# Patient Record
Sex: FEMALE | HISPANIC OR LATINO | Employment: FULL TIME | URBAN - METROPOLITAN AREA
[De-identification: names, ages, dates, MRNs, and addresses within clinical notes are randomized per-mention and may not be internally consistent; named-entity substitution may affect disease eponyms.]

---

## 2021-07-08 ENCOUNTER — HOSPITAL ENCOUNTER (OUTPATIENT)
Facility: HOSPITAL | Age: 41
Setting detail: OBSERVATION
Discharge: HOME | End: 2021-07-10
Attending: EMERGENCY MEDICINE | Admitting: INTERNAL MEDICINE

## 2021-07-08 DIAGNOSIS — R55 SYNCOPE AND COLLAPSE: Primary | ICD-10-CM

## 2021-07-08 DIAGNOSIS — K52.9 ENTERITIS: ICD-10-CM

## 2021-07-08 DIAGNOSIS — R19.7 DIARRHEA, UNSPECIFIED TYPE: ICD-10-CM

## 2021-07-08 DIAGNOSIS — R56.9 WITNESSED SEIZURE-LIKE ACTIVITY (HCC): ICD-10-CM

## 2021-07-08 DIAGNOSIS — E87.6 HYPOKALEMIA: ICD-10-CM

## 2021-07-08 DIAGNOSIS — I95.9 HYPOTENSION, UNSPECIFIED HYPOTENSION TYPE: ICD-10-CM

## 2021-07-08 PROCEDURE — 93005 ELECTROCARDIOGRAM TRACING: CPT | Performed by: EMERGENCY MEDICINE

## 2021-07-08 PROCEDURE — 80053 COMPREHEN METABOLIC PANEL: CPT | Performed by: EMERGENCY MEDICINE

## 2021-07-08 PROCEDURE — 83690 ASSAY OF LIPASE: CPT | Performed by: EMERGENCY MEDICINE

## 2021-07-08 PROCEDURE — 93010 ELECTROCARDIOGRAM REPORT: CPT | Performed by: INTERNAL MEDICINE

## 2021-07-08 PROCEDURE — 99285 EMERGENCY DEPT VISIT HI MDM: CPT

## 2021-07-08 PROCEDURE — 85025 COMPLETE CBC W/AUTO DIFF WBC: CPT | Performed by: EMERGENCY MEDICINE

## 2021-07-08 PROCEDURE — 95806 SLEEP STUDY UNATT&RESP EFFT: CPT | Performed by: PSYCHIATRY & NEUROLOGY

## 2021-07-08 PROCEDURE — 84703 CHORIONIC GONADOTROPIN ASSAY: CPT | Performed by: EMERGENCY MEDICINE

## 2021-07-08 RX ORDER — SODIUM CHLORIDE 0.9 % (FLUSH) 0.9 %
10 SYRINGE (ML) INJECTION AS NEEDED
Status: DISCONTINUED | OUTPATIENT
Start: 2021-07-08 | End: 2021-07-10 | Stop reason: HOSPADM

## 2021-07-08 RX ADMIN — SODIUM CHLORIDE 1000 ML: 9 INJECTION, SOLUTION INTRAVENOUS at 23:40

## 2021-07-09 ENCOUNTER — APPOINTMENT (OUTPATIENT)
Dept: CT IMAGING | Facility: HOSPITAL | Age: 41
End: 2021-07-09

## 2021-07-09 PROBLEM — E87.6 HYPOKALEMIA: Status: ACTIVE | Noted: 2021-07-09

## 2021-07-09 PROBLEM — K52.9 ENTERITIS: Status: ACTIVE | Noted: 2021-07-09

## 2021-07-09 PROBLEM — R55 SYNCOPE AND COLLAPSE: Status: ACTIVE | Noted: 2021-07-09

## 2021-07-09 LAB
ALBUMIN SERPL-MCNC: 3.4 G/DL (ref 3.5–5.2)
ALBUMIN/GLOB SERPL: 2 G/DL
ALP SERPL-CCNC: 33 U/L (ref 39–117)
ALT SERPL W P-5'-P-CCNC: 15 U/L (ref 1–33)
ANION GAP SERPL CALCULATED.3IONS-SCNC: 10.7 MMOL/L (ref 5–15)
ANION GAP SERPL CALCULATED.3IONS-SCNC: 13.1 MMOL/L (ref 5–15)
AST SERPL-CCNC: 15 U/L (ref 1–32)
BASOPHILS # BLD AUTO: 0.05 10*3/MM3 (ref 0–0.2)
BASOPHILS NFR BLD AUTO: 0.8 % (ref 0–1.5)
BILIRUB SERPL-MCNC: 0.2 MG/DL (ref 0–1.2)
BILIRUB UR QL STRIP: NEGATIVE
BUN SERPL-MCNC: 11 MG/DL (ref 6–20)
BUN SERPL-MCNC: 16 MG/DL (ref 6–20)
BUN/CREAT SERPL: 20.4 (ref 7–25)
BUN/CREAT SERPL: 21.6 (ref 7–25)
CALCIUM SPEC-SCNC: 8.1 MG/DL (ref 8.6–10.5)
CALCIUM SPEC-SCNC: 8.2 MG/DL (ref 8.6–10.5)
CHLORIDE SERPL-SCNC: 102 MMOL/L (ref 98–107)
CHLORIDE SERPL-SCNC: 109 MMOL/L (ref 98–107)
CLARITY UR: CLEAR
CO2 SERPL-SCNC: 18.3 MMOL/L (ref 22–29)
CO2 SERPL-SCNC: 21.9 MMOL/L (ref 22–29)
COLOR UR: YELLOW
CREAT SERPL-MCNC: 0.54 MG/DL (ref 0.57–1)
CREAT SERPL-MCNC: 0.74 MG/DL (ref 0.57–1)
DEPRECATED RDW RBC AUTO: 40.2 FL (ref 37–54)
DEPRECATED RDW RBC AUTO: 41.8 FL (ref 37–54)
EOSINOPHIL # BLD AUTO: 0.07 10*3/MM3 (ref 0–0.4)
EOSINOPHIL NFR BLD AUTO: 1.1 % (ref 0.3–6.2)
ERYTHROCYTE [DISTWIDTH] IN BLOOD BY AUTOMATED COUNT: 11.8 % (ref 12.3–15.4)
ERYTHROCYTE [DISTWIDTH] IN BLOOD BY AUTOMATED COUNT: 12.1 % (ref 12.3–15.4)
GFR SERPL CREATININE-BSD FRML MDRD: 106 ML/MIN/1.73
GFR SERPL CREATININE-BSD FRML MDRD: 126 ML/MIN/1.73
GFR SERPL CREATININE-BSD FRML MDRD: 88 ML/MIN/1.73
GFR SERPL CREATININE-BSD FRML MDRD: >150 ML/MIN/1.73
GLOBULIN UR ELPH-MCNC: 1.7 GM/DL
GLUCOSE SERPL-MCNC: 116 MG/DL (ref 65–99)
GLUCOSE SERPL-MCNC: 84 MG/DL (ref 65–99)
GLUCOSE UR STRIP-MCNC: NEGATIVE MG/DL
HCG SERPL QL: NEGATIVE
HCT VFR BLD AUTO: 33.2 % (ref 34–46.6)
HCT VFR BLD AUTO: 35.4 % (ref 34–46.6)
HGB BLD-MCNC: 11.4 G/DL (ref 12–15.9)
HGB BLD-MCNC: 12.3 G/DL (ref 12–15.9)
HGB UR QL STRIP.AUTO: NEGATIVE
KETONES UR QL STRIP: NEGATIVE
LEUKOCYTE ESTERASE UR QL STRIP.AUTO: NEGATIVE
LIPASE SERPL-CCNC: 18 U/L (ref 13–60)
LYMPHOCYTES # BLD AUTO: 2.41 10*3/MM3 (ref 0.7–3.1)
LYMPHOCYTES NFR BLD AUTO: 37.2 % (ref 19.6–45.3)
MCH RBC QN AUTO: 32.5 PG (ref 26.6–33)
MCH RBC QN AUTO: 32.6 PG (ref 26.6–33)
MCHC RBC AUTO-ENTMCNC: 34.3 G/DL (ref 31.5–35.7)
MCHC RBC AUTO-ENTMCNC: 34.7 G/DL (ref 31.5–35.7)
MCV RBC AUTO: 93.4 FL (ref 79–97)
MCV RBC AUTO: 94.9 FL (ref 79–97)
MONOCYTES # BLD AUTO: 0.53 10*3/MM3 (ref 0.1–0.9)
MONOCYTES NFR BLD AUTO: 8.2 % (ref 5–12)
NEUTROPHILS NFR BLD AUTO: 3.39 10*3/MM3 (ref 1.7–7)
NEUTROPHILS NFR BLD AUTO: 52.4 % (ref 42.7–76)
NITRITE UR QL STRIP: NEGATIVE
PH UR STRIP.AUTO: <=5 [PH] (ref 5–8)
PLATELET # BLD AUTO: 242 10*3/MM3 (ref 140–450)
PLATELET # BLD AUTO: 247 10*3/MM3 (ref 140–450)
PMV BLD AUTO: 8.8 FL (ref 6–12)
PMV BLD AUTO: 8.9 FL (ref 6–12)
POTASSIUM SERPL-SCNC: 3.1 MMOL/L (ref 3.5–5.2)
POTASSIUM SERPL-SCNC: 4 MMOL/L (ref 3.5–5.2)
PROT SERPL-MCNC: 5.1 G/DL (ref 6–8.5)
PROT UR QL STRIP: NEGATIVE
QT INTERVAL: 382 MS
RBC # BLD AUTO: 3.5 10*6/MM3 (ref 3.77–5.28)
RBC # BLD AUTO: 3.79 10*6/MM3 (ref 3.77–5.28)
SARS-COV-2 ORF1AB RESP QL NAA+PROBE: NOT DETECTED
SODIUM SERPL-SCNC: 137 MMOL/L (ref 136–145)
SODIUM SERPL-SCNC: 138 MMOL/L (ref 136–145)
SP GR UR STRIP: 1.02 (ref 1–1.03)
UROBILINOGEN UR QL STRIP: NORMAL
WBC # BLD AUTO: 4.32 10*3/MM3 (ref 3.4–10.8)
WBC # BLD AUTO: 6.47 10*3/MM3 (ref 3.4–10.8)

## 2021-07-09 PROCEDURE — 74177 CT ABD & PELVIS W/CONTRAST: CPT

## 2021-07-09 PROCEDURE — 96374 THER/PROPH/DIAG INJ IV PUSH: CPT

## 2021-07-09 PROCEDURE — 81003 URINALYSIS AUTO W/O SCOPE: CPT | Performed by: EMERGENCY MEDICINE

## 2021-07-09 PROCEDURE — C9803 HOPD COVID-19 SPEC COLLECT: HCPCS

## 2021-07-09 PROCEDURE — 80048 BASIC METABOLIC PNL TOTAL CA: CPT | Performed by: NURSE PRACTITIONER

## 2021-07-09 PROCEDURE — G0378 HOSPITAL OBSERVATION PER HR: HCPCS

## 2021-07-09 PROCEDURE — 36415 COLL VENOUS BLD VENIPUNCTURE: CPT | Performed by: NURSE PRACTITIONER

## 2021-07-09 PROCEDURE — 82550 ASSAY OF CK (CPK): CPT | Performed by: HOSPITALIST

## 2021-07-09 PROCEDURE — 96361 HYDRATE IV INFUSION ADD-ON: CPT

## 2021-07-09 PROCEDURE — 85027 COMPLETE CBC AUTOMATED: CPT | Performed by: NURSE PRACTITIONER

## 2021-07-09 PROCEDURE — 99204 OFFICE O/P NEW MOD 45 MIN: CPT | Performed by: PSYCHIATRY & NEUROLOGY

## 2021-07-09 PROCEDURE — 25010000002 KETOROLAC TROMETHAMINE PER 15 MG: Performed by: EMERGENCY MEDICINE

## 2021-07-09 PROCEDURE — U0004 COV-19 TEST NON-CDC HGH THRU: HCPCS | Performed by: HOSPITALIST

## 2021-07-09 PROCEDURE — 25810000003 SODIUM CHLORIDE 0.9 % WITH KCL 20 MEQ 20-0.9 MEQ/L-% SOLUTION: Performed by: NURSE PRACTITIONER

## 2021-07-09 PROCEDURE — 25010000002 IOPAMIDOL 61 % SOLUTION: Performed by: EMERGENCY MEDICINE

## 2021-07-09 RX ORDER — TOPIRAMATE 50 MG/1
50 TABLET, FILM COATED ORAL NIGHTLY
Status: DISCONTINUED | OUTPATIENT
Start: 2021-07-09 | End: 2021-07-10 | Stop reason: HOSPADM

## 2021-07-09 RX ORDER — SUMATRIPTAN 100 MG/1
100 TABLET, FILM COATED ORAL ONCE
Status: COMPLETED | OUTPATIENT
Start: 2021-07-09 | End: 2021-07-09

## 2021-07-09 RX ORDER — SODIUM CHLORIDE AND POTASSIUM CHLORIDE 150; 900 MG/100ML; MG/100ML
100 INJECTION, SOLUTION INTRAVENOUS CONTINUOUS
Status: DISCONTINUED | OUTPATIENT
Start: 2021-07-09 | End: 2021-07-09

## 2021-07-09 RX ORDER — ACETAMINOPHEN 325 MG/1
650 TABLET ORAL EVERY 4 HOURS PRN
Status: DISCONTINUED | OUTPATIENT
Start: 2021-07-09 | End: 2021-07-10 | Stop reason: HOSPADM

## 2021-07-09 RX ORDER — MULTIPLE VITAMINS W/ MINERALS TAB 9MG-400MCG
1 TAB ORAL DAILY
COMMUNITY

## 2021-07-09 RX ORDER — NITROGLYCERIN 0.4 MG/1
0.4 TABLET SUBLINGUAL
Status: DISCONTINUED | OUTPATIENT
Start: 2021-07-09 | End: 2021-07-10 | Stop reason: HOSPADM

## 2021-07-09 RX ORDER — ONDANSETRON 4 MG/1
4 TABLET, FILM COATED ORAL EVERY 6 HOURS PRN
Status: DISCONTINUED | OUTPATIENT
Start: 2021-07-09 | End: 2021-07-10 | Stop reason: HOSPADM

## 2021-07-09 RX ORDER — ALUMINA, MAGNESIA, AND SIMETHICONE 2400; 2400; 240 MG/30ML; MG/30ML; MG/30ML
15 SUSPENSION ORAL EVERY 6 HOURS PRN
Status: DISCONTINUED | OUTPATIENT
Start: 2021-07-09 | End: 2021-07-10 | Stop reason: HOSPADM

## 2021-07-09 RX ORDER — SODIUM CHLORIDE 0.9 % (FLUSH) 0.9 %
10 SYRINGE (ML) INJECTION AS NEEDED
Status: DISCONTINUED | OUTPATIENT
Start: 2021-07-09 | End: 2021-07-10 | Stop reason: HOSPADM

## 2021-07-09 RX ORDER — MELATONIN
1000 DAILY
COMMUNITY

## 2021-07-09 RX ORDER — KETOROLAC TROMETHAMINE 15 MG/ML
15 INJECTION, SOLUTION INTRAMUSCULAR; INTRAVENOUS ONCE
Status: COMPLETED | OUTPATIENT
Start: 2021-07-09 | End: 2021-07-09

## 2021-07-09 RX ORDER — ONDANSETRON 2 MG/ML
4 INJECTION INTRAMUSCULAR; INTRAVENOUS EVERY 6 HOURS PRN
Status: DISCONTINUED | OUTPATIENT
Start: 2021-07-09 | End: 2021-07-10 | Stop reason: HOSPADM

## 2021-07-09 RX ADMIN — SUMATRIPTAN SUCCINATE 100 MG: 100 TABLET, FILM COATED ORAL at 19:50

## 2021-07-09 RX ADMIN — IOPAMIDOL 85 ML: 612 INJECTION, SOLUTION INTRAVENOUS at 01:16

## 2021-07-09 RX ADMIN — POTASSIUM CHLORIDE AND SODIUM CHLORIDE 100 ML/HR: 900; 150 INJECTION, SOLUTION INTRAVENOUS at 08:51

## 2021-07-09 RX ADMIN — KETOROLAC TROMETHAMINE 15 MG: 15 INJECTION, SOLUTION INTRAMUSCULAR; INTRAVENOUS at 01:57

## 2021-07-09 RX ADMIN — TOPIRAMATE 50 MG: 50 TABLET, FILM COATED ORAL at 19:51

## 2021-07-09 RX ADMIN — SODIUM CHLORIDE 1000 ML: 9 INJECTION, SOLUTION INTRAVENOUS at 01:57

## 2021-07-09 NOTE — CASE MANAGEMENT/SOCIAL WORK
Discharge Planning Assessment  [facility]     Patient Name: Kelsey Theodore  MRN: [id number]  Today's Date: 7/9/2021    Admit Date: 7/8/2021    Discharge Needs Assessment     Row Name 07/09/21 0840       Living Environment    Lives With  spouse;child(sheila), dependent    Current Living Arrangements  home/apartment/condo    Primary Care Provided by  self    Provides Primary Care For  child(sheila)    Family Caregiver if Needed  spouse    Quality of Family Relationships  helpful;involved;supportive    Able to Return to Prior Arrangements  yes       Resource/Environmental Concerns    Resource/Environmental Concerns  none       Transition Planning    Patient/Family Anticipates Transition to  home with family    Patient/Family Anticipated Services at Transition  none    Transportation Anticipated  family or friend will provide;car, drives self       Discharge Needs Assessment    Readmission Within the Last 30 Days  no previous admission in last 30 days    Equipment Currently Used at Home  none    Concerns to be Addressed  denies needs/concerns at this time;no discharge needs identified    Anticipated Changes Related to Illness  none    Equipment Needed After Discharge  none    Provided Post Acute Provider List?  N/A    Provided Post Acute Provider Quality & Resource List?  N/A        Discharge Plan     Row Name 07/09/21 0840       Plan    Plan  Home with family    Plan Comments  S/W pt and her spouse Alonzo at bedside.  Facesheet and pharmacy info confirmed.  Pt lives in a house with her  and 4 children, is IADLs and can drive.  She works full time.  No hx of DME, HH or SNF. Pt plans to return home with her family upon DC.  She denies any DC needs at this time.  CCP will continue to follow ..........sk        Continued Care and Services - Admitted Since 7/8/2021    Coordination has not been started for this encounter.         Demographic Summary     Row Name 07/09/21 0838       General Information    Admission Type   observation    Arrived From  home    Referral Source  admission list    Reason for Consult  discharge planning    Preferred Language  English        Functional Status     Row Name 07/09/21 0838       Functional Status    Usual Activity Tolerance  good    Current Activity Tolerance  good       Functional Status, IADL    Medications  independent    Meal Preparation  independent;assistive person    Housekeeping  independent;assistive person    Laundry  independent;assistive person    Shopping  independent;assistive person       Mental Status    General Appearance WDL  WDL       Mental Status Summary    Recent Changes in Mental Status/Cognitive Functioning  no changes       Employment/    Employment Status  employed full-time            Savannah Michael RN

## 2021-07-09 NOTE — ED PROVIDER NOTES
EMERGENCY DEPARTMENT ENCOUNTER    CHIEF COMPLAINT  Chief Complaint: Syncope/abdominal pain  History given by: Patient  History limited by: None  Room Number:   PMD: Provider, No Known      HPI:  Pt is a 40 y.o. female who presents complaining of gradual in onset but progressively worsening generalized abdominal discomfort that has been intermittently present for the past 2 weeks.  She states that the pain did become more severe tonight causing her to pass out several times.  Upon EMS arrival, the patient was moderately hypotensive and was given a liter of normal saline in route.  The patient describes the discomfort as a crampy sensation that is nonlocalized and nonradiating.  She states that it is moderate in intensity and diffusely through her abdomen.  She states that it is made worse by touch and not alleviated by any known factor thus far.  She denies previous episodes of similar symptoms.  She does state that she has some associated watery diarrhea but denies nausea/vomiting, fever/chills, or back pain.  She denies any recent antibiotic use or any recent travel.      PAST MEDICAL HISTORY  Active Ambulatory Problems     Diagnosis Date Noted   • No Active Ambulatory Problems     Resolved Ambulatory Problems     Diagnosis Date Noted   • No Resolved Ambulatory Problems     No Additional Past Medical History       PAST SURGICAL HISTORY  Past Surgical History:   Procedure Laterality Date   • ABDOMINOPLASTY     •  SECTION         FAMILY HISTORY  History reviewed. No pertinent family history.    SOCIAL HISTORY  Social History     Socioeconomic History   • Marital status:      Spouse name: Not on file   • Number of children: Not on file   • Years of education: Not on file   • Highest education level: Not on file       ALLERGIES  Penicillins    REVIEW OF SYSTEMS  Review of Systems   Constitutional: Negative for fever.   HENT: Negative for sore throat.    Eyes: Negative.    Respiratory: Negative for  cough and shortness of breath.    Cardiovascular: Negative for chest pain.   Gastrointestinal: Positive for abdominal pain and diarrhea. Negative for vomiting.   Genitourinary: Negative for dysuria.   Musculoskeletal: Negative for neck pain.   Skin: Negative for rash.   Allergic/Immunologic: Negative.    Neurological: Negative for weakness, numbness and headaches.   Hematological: Negative.    Psychiatric/Behavioral: Negative.    All other systems reviewed and are negative.      PHYSICAL EXAM  ED Triage Vitals [07/08/21 2300]   Temp Heart Rate Resp BP SpO2   97.6 °F (36.4 °C) 99 18 97/56 98 %      Temp src Heart Rate Source Patient Position BP Location FiO2 (%)   Tympanic Monitor Lying Right arm --       Physical Exam  Vitals and nursing note reviewed.   Constitutional:       General: She is not in acute distress.  HENT:      Head: Normocephalic and atraumatic.   Eyes:      Pupils: Pupils are equal, round, and reactive to light.   Cardiovascular:      Rate and Rhythm: Normal rate and regular rhythm.      Heart sounds: Normal heart sounds.   Pulmonary:      Effort: Pulmonary effort is normal. No respiratory distress.      Breath sounds: Normal breath sounds.   Abdominal:      Palpations: Abdomen is soft.      Tenderness: There is generalized abdominal tenderness. There is no guarding or rebound.   Musculoskeletal:         General: Normal range of motion.      Cervical back: Normal range of motion and neck supple.   Skin:     General: Skin is warm and dry.      Findings: No rash.   Neurological:      Mental Status: She is alert and oriented to person, place, and time.      Sensory: Sensation is intact.   Psychiatric:         Mood and Affect: Mood and affect normal.         LAB RESULTS  Lab Results (last 24 hours)     Procedure Component Value Units Date/Time    CBC & Differential [[id number]]  (Abnormal) Collected: 07/08/21 2349    Specimen: Blood Updated: 07/09/21 0006    Narrative:      The following orders were  created for panel order CBC & Differential.  Procedure                               Abnormality         Status                     ---------                               -----------         ------                     CBC Auto Differential[[id number]]        Abnormal            Final result                 Please view results for these tests on the individual orders.    Comprehensive Metabolic Panel [[id number]]  (Abnormal) Collected: 07/08/21 2349    Specimen: Blood Updated: 07/09/21 0014     Glucose 116 mg/dL      BUN 16 mg/dL      Creatinine 0.74 mg/dL      Sodium 137 mmol/L      Potassium 3.1 mmol/L      Chloride 102 mmol/L      CO2 21.9 mmol/L      Calcium 8.2 mg/dL      Total Protein 5.1 g/dL      Albumin 3.40 g/dL      ALT (SGPT) 15 U/L      AST (SGOT) 15 U/L      Alkaline Phosphatase 33 U/L      Total Bilirubin 0.2 mg/dL      eGFR Non African Amer 88 mL/min/1.73      eGFR  African Amer 106 mL/min/1.73      Globulin 1.7 gm/dL      A/G Ratio 2.0 g/dL      BUN/Creatinine Ratio 21.6     Anion Gap 13.1 mmol/L     Narrative:      GFR Normal >60  Chronic Kidney Disease <60  Kidney Failure <15      Lipase [[id number]]  (Normal) Collected: 07/08/21 2349    Specimen: Blood Updated: 07/09/21 0014     Lipase 18 U/L     hCG, Serum, Qualitative [[id number]]  (Normal) Collected: 07/08/21 2349    Specimen: Blood Updated: 07/09/21 0013     HCG Qualitative Negative    CBC Auto Differential [[id number]]  (Abnormal) Collected: 07/08/21 2349    Specimen: Blood Updated: 07/09/21 0006     WBC 6.47 10*3/mm3      RBC 3.50 10*6/mm3      Hemoglobin 11.4 g/dL      Hematocrit 33.2 %      MCV 94.9 fL      MCH 32.6 pg      MCHC 34.3 g/dL      RDW 12.1 %      RDW-SD 41.8 fl      MPV 8.9 fL      Platelets 242 10*3/mm3      Neutrophil % 52.4 %      Lymphocyte % 37.2 %      Monocyte % 8.2 %      Eosinophil % 1.1 %      Basophil % 0.8 %      Neutrophils, Absolute 3.39 10*3/mm3      Lymphocytes, Absolute 2.41 10*3/mm3      Monocytes, Absolute 0.53  10*3/mm3      Eosinophils, Absolute 0.07 10*3/mm3      Basophils, Absolute 0.05 10*3/mm3           I ordered the above labs and reviewed the results    RADIOLOGY  CT Abdomen Pelvis With Contrast   Final Result           I ordered the above noted radiological studies. Interpreted by radiologist. Discussed with radiologist (Dr. Gottlieb). Reviewed by me in PACS.       PROCEDURES  Procedures  EKG          EKG time: 2342  Rhythm/Rate: NSR, 83  P waves and NV: nml  QRS, axis: nml, nml   ST and T waves: nml     Interpreted Contemporaneously by me, independently viewed  No previous EKG available for comparison      PROGRESS AND CONSULTS     The patient was wearing a facemask upon entrance into the room and remained in such throughout their visit.  I was wearing PPE including a facemask, eye protection, as well as gloves at any point entering the room and throughout the visit    0130  On reevaluation, after 2 L of fluid in the emergency room, the patient's blood pressure has improved and is now over 100 systolic.  She states that her abdominal discomfort has improved somewhat.  I did inform her that the CT scan is consistent with likely enteritis however with the ongoing discomfort and the multiple syncopal events, would like to admit her to the hospital for further management and treatment.  The patient as well as patient's family are in agreement and all questions have been answered.    0235  Case discussed with VENANCIO Boss for [facility], who agrees to admit the patient to the hospital for Dr. Peng      MEDICAL DECISION MAKING  Results were reviewed/discussed with the patient and they were also made aware of online access. Pt also made aware that some labs, such as cultures, will not be resulted during ER visit and follow up with PMD is necessary.     MDM  Number of Diagnoses or Management Options     Amount and/or Complexity of Data Reviewed  Clinical lab tests: ordered and reviewed  Tests in the radiology  section of CPT®: ordered and reviewed  Tests in the medicine section of CPT®: ordered and reviewed  Review and summarize past medical records: yes (There are no previous emergency room records available for review)  Discuss the patient with other providers: yes (VENANCIO Boss for [facility], who will admit the patient for Dr. Peng)  Independent visualization of images, tracings, or specimens: yes (CT scan of the abdomen and pelvis consistent with enteritis without obstruction or abscess.)           DIAGNOSIS  Final diagnoses:   Syncope and collapse   Enteritis   Diarrhea, unspecified type   Hypotension, unspecified hypotension type       DISPOSITION  ADMISSION    Discussed treatment plan and reason for admission with pt/family and admitting physician.  Pt/family voiced understanding of the plan for admission for further testing/treatment as needed.         Latest Documented Vital Signs:  As of 02:42 EDT  BP- 101/65 HR- 79 Temp- 97.6 °F (36.4 °C) (Tympanic) O2 sat- 99%         David Fall MD  07/09/21 0243

## 2021-07-09 NOTE — H&P
Patient Name:  Kelsey Theodore  YOB: 1980  MRN:  [id number]  Admit Date:  7/8/2021  Patient Care Team:  Provider, No Known as PCP - General      Subjective   History Present Illness     Chief Complaint   Patient presents with   • Syncope   • Abdominal Pain     History of Present Illness   Ms. Theodore is a 40 y.o. non-smoker with no known medical history that presents to [facility] complaining of abdominal pain diarrhea.  Patient reports has been having intermittent abdominal pain for the past 2 weeks and diarrhea for the past 2 days.  She reports abdominal pain is crampy in sensation and nonradiating.  She reports nothing alleviates her symptoms and nothing makes it worse.  Patient's  at bedside states that she has not been eating much of anything lately due to stress.  She admits to nausea but denies vomiting.  She was with some friends today and apparently she passed out twice.  Upon EMS arrival, patient's systolic blood pressure was in the 70s.  CT abdominal pelvis shows enteritis.  She has been admitted for further evaluation and treatment.    Review of Systems   Constitutional: Negative for chills and fever.   HENT: Negative for congestion and rhinorrhea.    Eyes: Negative for photophobia and visual disturbance.   Respiratory: Negative for cough and shortness of breath.    Cardiovascular: Negative for chest pain and palpitations.   Gastrointestinal: Positive for abdominal pain, diarrhea and nausea. Negative for constipation and vomiting.   Endocrine: Negative for cold intolerance and heat intolerance.   Genitourinary: Negative for difficulty urinating and dysuria.   Musculoskeletal: Negative for gait problem and joint swelling.   Skin: Negative for rash and wound.   Neurological: Negative for dizziness, light-headedness and headaches.   Psychiatric/Behavioral: Negative for sleep disturbance and suicidal ideas.        Personal History     History reviewed. No pertinent  past medical history.  Past Surgical History:   Procedure Laterality Date   • ABDOMINOPLASTY     •  SECTION       History reviewed. No pertinent family history.  Social History     Tobacco Use   • Smoking status: Never Smoker   • Smokeless tobacco: Never Used   Substance Use Topics   • Alcohol use: Not on file   • Drug use: Not on file     No current facility-administered medications on file prior to encounter.     Current Outpatient Medications on File Prior to Encounter   Medication Sig Dispense Refill   • cholecalciferol (VITAMIN D3) 25 MCG (1000 UT) tablet Take 1,000 Units by mouth Daily.     • multivitamin with minerals (MULTIVITAMIN ADULT PO) Take 1 tablet by mouth Daily.       Allergies   Allergen Reactions   • Penicillins Other (See Comments)     UNSURE        Objective    Objective     Vital Signs  Temp:  [97.6 °F (36.4 °C)] 97.6 °F (36.4 °C)  Heart Rate:  [74-99] 74  Resp:  [18] 18  BP: ()/(54-65) 99/63  SpO2:  [97 %-100 %] 98 %  on   ;   Device (Oxygen Therapy): room air  Body mass index is 25.53 kg/m².    Physical Exam  Vitals and nursing note reviewed.   Constitutional:       General: She is not in acute distress.     Appearance: She is well-developed. She is not toxic-appearing.   HENT:      Head: Normocephalic and atraumatic.   Eyes:      General: No scleral icterus.        Right eye: No discharge.         Left eye: No discharge.      Conjunctiva/sclera: Conjunctivae normal.   Neck:      Vascular: No JVD.   Cardiovascular:      Rate and Rhythm: Normal rate and regular rhythm.      Heart sounds: Normal heart sounds. No murmur heard.   No friction rub. No gallop.    Pulmonary:      Effort: Pulmonary effort is normal. No respiratory distress.      Breath sounds: Normal breath sounds. No wheezing or rales.   Abdominal:      General: Bowel sounds are normal. There is no distension.      Palpations: Abdomen is soft.      Tenderness: There is abdominal tenderness. There is no guarding or  rebound.   Musculoskeletal:         General: No tenderness or deformity. Normal range of motion.      Cervical back: Normal range of motion and neck supple.   Skin:     General: Skin is warm and dry.      Capillary Refill: Capillary refill takes less than 2 seconds.   Neurological:      Mental Status: She is alert and oriented to person, place, and time.   Psychiatric:         Behavior: Behavior normal.       Results Review:  I reviewed the patient's new clinical results.  Discussed with ED provider.    Lab Results (last 24 hours)     Procedure Component Value Units Date/Time    CBC & Differential [[id number]]  (Abnormal) Collected: 07/08/21 2349    Specimen: Blood Updated: 07/09/21 0006    Narrative:      The following orders were created for panel order CBC & Differential.  Procedure                               Abnormality         Status                     ---------                               -----------         ------                     CBC Auto Differential[[id number]]        Abnormal            Final result                 Please view results for these tests on the individual orders.    Comprehensive Metabolic Panel [[id number]]  (Abnormal) Collected: 07/08/21 2349    Specimen: Blood Updated: 07/09/21 0014     Glucose 116 mg/dL      BUN 16 mg/dL      Creatinine 0.74 mg/dL      Sodium 137 mmol/L      Potassium 3.1 mmol/L      Chloride 102 mmol/L      CO2 21.9 mmol/L      Calcium 8.2 mg/dL      Total Protein 5.1 g/dL      Albumin 3.40 g/dL      ALT (SGPT) 15 U/L      AST (SGOT) 15 U/L      Alkaline Phosphatase 33 U/L      Total Bilirubin 0.2 mg/dL      eGFR Non African Amer 88 mL/min/1.73      eGFR  African Amer 106 mL/min/1.73      Globulin 1.7 gm/dL      A/G Ratio 2.0 g/dL      BUN/Creatinine Ratio 21.6     Anion Gap 13.1 mmol/L     Narrative:      GFR Normal >60  Chronic Kidney Disease <60  Kidney Failure <15      Lipase [[id number]]  (Normal) Collected: 07/08/21 2349    Specimen: Blood Updated: 07/09/21  0014     Lipase 18 U/L     hCG, Serum, Qualitative [[id number]]  (Normal) Collected: 07/08/21 2349    Specimen: Blood Updated: 07/09/21 0013     HCG Qualitative Negative    CBC Auto Differential [[id number]]  (Abnormal) Collected: 07/08/21 2349    Specimen: Blood Updated: 07/09/21 0006     WBC 6.47 10*3/mm3      RBC 3.50 10*6/mm3      Hemoglobin 11.4 g/dL      Hematocrit 33.2 %      MCV 94.9 fL      MCH 32.6 pg      MCHC 34.3 g/dL      RDW 12.1 %      RDW-SD 41.8 fl      MPV 8.9 fL      Platelets 242 10*3/mm3      Neutrophil % 52.4 %      Lymphocyte % 37.2 %      Monocyte % 8.2 %      Eosinophil % 1.1 %      Basophil % 0.8 %      Neutrophils, Absolute 3.39 10*3/mm3      Lymphocytes, Absolute 2.41 10*3/mm3      Monocytes, Absolute 0.53 10*3/mm3      Eosinophils, Absolute 0.07 10*3/mm3      Basophils, Absolute 0.05 10*3/mm3     COVID PRE-OP / PRE-PROCEDURE SCREENING ORDER (NO ISOLATION) - Swab, Nasopharynx [[id number]] Collected: 07/09/21 0255    Specimen: Swab from Nasopharynx Updated: 07/09/21 0326    Narrative:      The following orders were created for panel order COVID PRE-OP / PRE-PROCEDURE SCREENING ORDER (NO ISOLATION) - Swab, Nasopharynx.  Procedure                               Abnormality         Status                     ---------                               -----------         ------                     COVID-19,APTIMA PANTHER,...[[id number]]                      In process                   Please view results for these tests on the individual orders.    COVID-19,APTIMA PANTHER,[facility] IN-HOUSE, NP/OP SWAB IN UTM/VTM/SALINE TRANSPORT MEDIA,24 HR TAT - Swab, Nasopharynx [[id number]] Collected: 07/09/21 0255    Specimen: Swab from Nasopharynx Updated: 07/09/21 0326    Urinalysis With Microscopic If Indicated (No Culture) - Urine, Clean Catch [[id number]]  (Normal) Collected: 07/09/21 0325    Specimen: Urine, Clean Catch Updated: 07/09/21 0343     Color, UA Yellow     Appearance, UA Clear     pH, UA <=5.0      Specific Gravity, UA 1.022     Glucose, UA Negative     Ketones, UA Negative     Bilirubin, UA Negative     Blood, UA Negative     Protein, UA Negative     Leuk Esterase, UA Negative     Nitrite, UA Negative     Urobilinogen, UA 0.2 E.U./dL    Narrative:      Urine microscopic not indicated.          Imaging Results (Last 24 Hours)     Procedure Component Value Units Date/Time    CT Abdomen Pelvis With Contrast [[id number]] Collected: 07/09/21 0120     Updated: 07/09/21 0154    Narrative:      CT ABDOMEN PELVIS W CONTRAST-     Radiation dose reduction techniques were utilized, including automated  exposure control and exposure modulation based on body size.     Pedicle: Abdominal pain, acute, nonlocalized     COMPARISON: None     FINDINGS: The liver, gallbladder, pancreas and spleen are satisfactory  in appearance. No biliary duct dilatation. Both adrenal glands are  normal. Both kidneys are normal. No obstructive uropathy or calculus.  Diameter of the aorta is within normal limits. Urinary bladder is  normal. Uterus is anteverted, size appropriate for age. Both ovaries are  symmetric and satisfactory in appearance. No free fluid within the  pelvic cul-de-sac. No adnexal abnormality seen.     Formed fecal material is demonstrated throughout the colon. No colon  Wall thickening nor induration of the surrounding fat to suggest an  inflammatory process. A loop of jejunum within the left upper quadrant  is prominent with wall and fold thickening, suggestive of enteritis. The  stomach is distended and fluid-filled, no wall thickening seen. The  duodenal bulb is satisfactory in appearance. The remainder of the  duodenum is unremarkable. No free air.     CONCLUSION: Prominent loop of jejunum within the left upper quadrant  with wall and fold thickening suggestive of enteritis. Stomach is  distended and fluid-filled however no wall thickening seen. The  remainder of examination is unremarkable.     FINDINGS of this port  called to Yasir Fall at the time of completion  1:30 AM.        This report was finalized on 7/9/2021 1:50 AM by Dr. Otf Gottlieb M.D.                 ECG 12 Lead   Preliminary Result   HEART RATE= 83  bpm   RR Interval= 720  ms   ME Interval= 217  ms   P Horizontal Axis= 10  deg   P Front Axis= 46  deg   QRSD Interval= 87  ms   QT Interval= 382  ms   QRS Axis= 66  deg   T Wave Axis= 39  deg   - ABNORMAL ECG -   Sinus rhythm   Prolonged ME interval   Electronically Signed By:    Date and Time of Study: 2021-07-08 23:42:51           Assessment/Plan     Active Hospital Problems    Diagnosis  POA   • **Enteritis [K52.9]  Yes   • Syncope and collapse [R55]  Yes   • Hypokalemia [E87.6]  Yes      Resolved Hospital Problems   No resolved problems to display.       Ms. Theodore is a 40 y.o. non-smoker who presents with abdominal pain, nausea, and diarrhea and reportedly had a syncopal episode x3 was found to have enteritis.    Enteritis  -Check stool for C. difficile and obtaine GI PCR panel  -Supportive care with IV hydration, antiemetics, and analgesia  -Consider GI consultation if no improvement by the morning    Syncope and collapse  -Likely secondary to hypotension as patient's systolic blood pressure was in the 70s.  Patient's  states patient has not been eating or drinking much for the last several days due to stress.  -Check orthostatic  -Defer cardiology consultation to winifred HEALY.    Hypokalemia  -Place  -Recheck in a.m.      I discussed the patient's findings and my recommendations with patient, family, nursing staff and ED provider.    VTE Prophylaxis - SCDs.  Code Status - Full code.       VENANCIO Peterson  [facility]  07/09/21  04:09 EDT

## 2021-07-09 NOTE — ED NOTES
Nursing report ED to floor  Kelsey Theodore  40 y.o.  female    HPI (triage note):   Chief Complaint   Patient presents with   • Syncope   • Abdominal Pain       Admitting doctor:   Devan Peng MD    Admitting diagnosis:   The primary encounter diagnosis was Syncope and collapse. Diagnoses of Enteritis, Diarrhea, unspecified type, and Hypotension, unspecified hypotension type were also pertinent to this visit.    Code status:   Current Code Status     Date Active Code Status Order ID Comments User Context       Not on file    Advance Care Planning Activity          Allergies:   Penicillins    Weight:   There were no vitals filed for this visit.    Most recent vitals:   Vitals:    07/08/21 2300 07/09/21 0030 07/09/21 0122 07/09/21 0130   BP: 97/56 95/54 105/62 101/65   BP Location: Right arm      Patient Position: Lying      Pulse: 99 84 79    Resp: 18      Temp: 97.6 °F (36.4 °C)      TempSrc: Tympanic      SpO2: 98% 97% 100% 99%       Active LDAs/IV Access:   Lines, Drains & Airways    Active LDAs     Name:   Placement date:   Placement time:   Site:   Days:    Peripheral IV 07/08/21 2327 Left Antecubital   07/08/21 2327    Antecubital   less than 1                Labs (abnormal labs have a star):   Labs Reviewed   COMPREHENSIVE METABOLIC PANEL - Abnormal; Notable for the following components:       Result Value    Glucose 116 (*)     Potassium 3.1 (*)     CO2 21.9 (*)     Calcium 8.2 (*)     Total Protein 5.1 (*)     Albumin 3.40 (*)     Alkaline Phosphatase 33 (*)     All other components within normal limits    Narrative:     GFR Normal >60  Chronic Kidney Disease <60  Kidney Failure <15     CBC WITH AUTO DIFFERENTIAL - Abnormal; Notable for the following components:    RBC 3.50 (*)     Hemoglobin 11.4 (*)     Hematocrit 33.2 (*)     RDW 12.1 (*)     All other components within normal limits   LIPASE - Normal   HCG, SERUM, QUALITATIVE - Normal   COVID PRE-OP / PRE-PROCEDURE SCREENING ORDER (NO  ISOLATION)    Narrative:     The following orders were created for panel order COVID PRE-OP / PRE-PROCEDURE SCREENING ORDER (NO ISOLATION) - Swab, Nasopharynx.  Procedure                               Abnormality         Status                     ---------                               -----------         ------                     COVID-19,APTIMA PANTHER,...[[id number]]                                                   Please view results for these tests on the individual orders.   COVID-19,APTIMA PANTHER,[facility],NP/OP SWAB IN UTM/VTM/SALINE TRANSPORT MEDIA,24 HR TAT   URINALYSIS W/ MICROSCOPIC IF INDICATED (NO CULTURE)   CBC AND DIFFERENTIAL    Narrative:     The following orders were created for panel order CBC & Differential.  Procedure                               Abnormality         Status                     ---------                               -----------         ------                     CBC Auto Differential[[id number]]        Abnormal            Final result                 Please view results for these tests on the individual orders.       EKG:   ECG 12 Lead   Preliminary Result   HEART RATE= 83  bpm   RR Interval= 720  ms   ME Interval= 217  ms   P Horizontal Axis= 10  deg   P Front Axis= 46  deg   QRSD Interval= 87  ms   QT Interval= 382  ms   QRS Axis= 66  deg   T Wave Axis= 39  deg   - ABNORMAL ECG -   Sinus rhythm   Prolonged ME interval   Electronically Signed By:    Date and Time of Study: 2021-07-08 23:42:51          Meds given in ED:   Medications   sodium chloride 0.9 % flush 10 mL (has no administration in time range)   sodium chloride 0.9 % bolus 1,000 mL (0 mL Intravenous Stopped 7/9/21 0145)   iopamidol (ISOVUE-300) 61 % injection 100 mL (85 mL Intravenous Given 7/9/21 0116)   sodium chloride 0.9 % bolus 1,000 mL (1,000 mL Intravenous New Bag 7/9/21 0157)   ketorolac (TORADOL) injection 15 mg (15 mg Intravenous Given 7/9/21 0157)       Imaging results:  No radiology results for the  last day    Ambulatory status:   - up with assistance     Social issues:   Social History     Socioeconomic History   • Marital status:      Spouse name: Not on file   • Number of children: Not on file   • Years of education: Not on file   • Highest education level: Not on file          Olivia Cevallos RN  07/09/21 0257

## 2021-07-09 NOTE — CONSULTS
DOS: 2021  NAME: Kelsey Theodore   : 1980  PCP: Ammon Low MD  CC: Stroke  Referring MD: Devan Peng MD    Neurological Problem and Interval History:  40 y.o. right-handed  with a Hx of recent nausea vomiting and diarrhea for the last 2 weeks also presented with headaches for the same duration of time.  Patient never had severe migraines in the past except for when she was a teenager.  But over the last 2 weeks she is gotten worse with these headaches and they are throbbing in nature mostly feels like a vice all over the head.  Yesterday she was with some of her friends 1 of whom was a registered nurse and it was noticed that she had convulsive episode x3 each lasting very short intervals of time but she never had any tongue bite or any biting of the inner part of the lips or the cheeks.  There was no associated loss of bowel and bladder control.  Her  is at the bedside who describes that she has been stressed out at her job and also at home with her 21-year-old son who has not been easy to discipline.  She has 2 other younger children at home.  When I came to see the patient she was preferring a quiet dark room but she was able to get up and ambulate the entire hallway without any problems and she denied any worsening of the headache when she was out in the fully lit hallway.  She never has any history of prior head injury or seizures as a child or as a baby with high fever or as a teenager.  No family history of seizures.  As per  she eats only 1 meal a day almost like a bird picking at food and has been drinking a lot of caffeinated beverages and drinks red wine on a regular basis.  For these headaches she had taken Excedrin Migraine from her  but said it did not help much.  She does not want to take any ibuprofen as it upsets her stomach.  Past Medical/Surgical Hx:  History reviewed. No pertinent past medical history.  Past Surgical History:    Procedure Laterality Date   • ABDOMINOPLASTY     •  SECTION         Review of Systems:    Constitutional: Pleasant lady currently in a semiupright position in the bed with her  at the bedside.  Cardiovascular: Denies any chest pain or palpitations.  Respiratory: Denies any shortness of breath.  Gastrointestinal: Currently denies any nausea and vomiting but recently had bouts of diarrhea.  Genitourinary: Denies any bladder incontinence.  Musculoskeletal: Denies any aches and pains.  Dermatological: No skin breakdown noted.  Neurological: Complains of headache currently 4 out of 10.  Psychiatric: Has lot of stress and anxiety at home and at her job.  Ophthalmological: Denies any photophobia at this point.          Medications On Admission  Medications Prior to Admission   Medication Sig Dispense Refill Last Dose   • cholecalciferol (VITAMIN D3) 25 MCG (1000 UT) tablet Take 1,000 Units by mouth Daily.   2021 at Unknown time   • multivitamin with minerals (MULTIVITAMIN ADULT PO) Take 1 tablet by mouth Daily.   2021 at Unknown time       Allergies:  Allergies   Allergen Reactions   • Penicillins Other (See Comments)     UNSURE        Social Hx:  Social History     Socioeconomic History   • Marital status:      Spouse name: Not on file   • Number of children: Not on file   • Years of education: Not on file   • Highest education level: Not on file   Tobacco Use   • Smoking status: Never Smoker   • Smokeless tobacco: Never Used       Family Hx:  History reviewed. No pertinent family history.  Significant neurologically.    Review of Imaging (Interpretation of images not reports): No neuro imaging has been performed at this time.      Laboratory Results:   Lab Results   Component Value Date    GLUCOSE 84 2021    CALCIUM 8.1 (L) 2021     2021    K 4.0 2021    CO2 18.3 (L) 2021     (H) 2021    BUN 11 2021    CREATININE 0.54 (L) 2021     "EGFRIFAFRI >150 07/09/2021    EGFRIFNONA 126 07/09/2021    BCR 20.4 07/09/2021    ANIONGAP 10.7 07/09/2021     Lab Results   Component Value Date    WBC 4.32 07/09/2021    HGB 12.3 07/09/2021    HCT 35.4 07/09/2021    MCV 93.4 07/09/2021     07/09/2021       Physical Examination:  /73 (BP Location: Left arm, Patient Position: Lying)   Pulse 83   Temp 97.9 °F (36.6 °C) (Oral)   Resp 18   Ht 154.9 cm (61\")   Wt 61.3 kg (135 lb 2.3 oz)   SpO2 97%   BMI 25.53 kg/m²   General Appearance:   Well developed, well nourished, well groomed, alert, and cooperative.  HEENT: Normocephalic.  Normal fundoscopic exam including normal retina, discs are flat with sharp margins, normal vasculature.  Neck and Spine: Normal range of motion.  Normal alignment. No mass or tenderness. No bruits.  Cardiac: Regular rate and rhythm. No murmurs.  Peripheral Vasculature: Radial and pedal pulses are equal and symmetric. No signs of distal embolization.  Extremities:    No edema or deformities. Normal joint ROM. OLGA hoses and SCD's in place  Skin:    No rashes or birth marks.    Neurological examination:  Higher Integrative  Function: Oriented to time, place and person. Normal registration, recall, attention span and concentration. Normal language including comprehension, spontaneous speech, repetition, reading, writing, naming and vocabulary. No neglect with normal visual-spatial function and construction. Normal fund of knowledge and higher integrative function.  CN II: Pupils are equal, round, and reactive to light. Normal visual acuity and visual fields.    CN III IV VI: Extraocular movements are full without nystagmus.   CN V: Normal facial sensation and strength of muscles of mastication.  CN VII: Facial movements are symmetric. No weakness.  CN VIII:   Auditory acuity is normal.  CN IX & X:   Symmetric palatal movement.  CN XI: Sternocleidomastoid and trapezius are normal.  No weakness.  CN XII:   The tongue is midline.  " No atrophy or fasciculations.  Motor: Normal muscle strength, bulk and tone in upper and lower extremities.  No fasciculations, rigidity, spasticity, or abnormal movements.  Reflexes: 2+ in the upper and lower extremities. Plantar responses are flexor.  Sensation: Normal to light touch, pinprick, vibration, temperature, and proprioception in arms and legs. Normal graphesthesia and no extinction on DSS.  Station and Gait: Normal gait and station.  She was able to get up and ambulate the entire hallway without problems without any assistance.  Romberg is negative and the Leyva balance test is also negative.  Coordination: Finger to nose test shows no dysmetria.  Rapid alternating movements are normal.  Heel to shin normal.      Diagnoses / Discussion:  40 y.o. who presents with Sx of migraine headaches following severe bouts of nausea vomiting and diarrhea for the last 2 weeks.  She had taken her 's Excedrin Migraine which did not help and the ibuprofen tends to upset her stomach.  She also has a lot of stress at work and at her home from her older son.  She was noted to have seizure-like episodes which could have been precipitated by lack of sleep and status migrainosus.    Plan:  MRI of the brain with and without contrast with thin sections through the hippocampus have been requested.  An EEG has already been requested but being a weekend we will not be able to get it until Monday.  So if she wants to go home this can be arranged as an outpatient with sleep deprived EEG for 1 hour.  Sumatriptan 100 mg 1 at the onset of severe headache and if not relieved in 2 hours a second dose not more than 2 in 24 hours has been requested.  For migraine prophylaxis Topamax 50 mg at bedtime with food has been requested and the patient does not have any history of glaucoma or kidney stones.   Lifestyle changes including avoiding too much caffeine intake as well as eating 3 proper meals a day, avoiding red wine and getting  proper sleep will be beneficial.  I have discussed the above with the patient and family.  She will be reevaluated by our inpatient neurology team tomorrow.  Time spent with patient: 60min    Coding    Dictated using Dragon dictation.

## 2021-07-09 NOTE — ED TRIAGE NOTES
Pt to ER by ems, with complaints of syncope. Upon ems arrival patient bp was 77/40 and a HR of 99. RLQ pain upon palpation. Pt received 1L LR by ems and P currently 95/56. Pt currently grimacing and grabbing right lower quadrant. Pt alert but slow to respond. Per ems friends thought pt was having seizure like activity.

## 2021-07-09 NOTE — PLAN OF CARE
Goal Outcome Evaluation:  Plan of Care Reviewed With: patient, spouse        Progress: improving  Outcome Summary: VSS. Pt has no complaints of abd pain or discomfort at this time. Pt has no signs of distress. Will continue plan of care and will continue to monitor.

## 2021-07-09 NOTE — PLAN OF CARE
Problem: Adult Inpatient Plan of Care  Goal: Absence of Hospital-Acquired Illness or Injury  Intervention: Identify and Manage Fall Risk  Recent Flowsheet Documentation  Taken 7/9/2021 1455 by Elizabeth Chand RN  Safety Promotion/Fall Prevention:   clutter free environment maintained   assistive device/personal items within reach  Taken 7/9/2021 1236 by Elizabeth Chand RN  Safety Promotion/Fall Prevention:   assistive device/personal items within reach   clutter free environment maintained  Taken 7/9/2021 1049 by Elizabeth Chand RN  Safety Promotion/Fall Prevention:   clutter free environment maintained   assistive device/personal items within reach  Taken 7/9/2021 0852 by Elizabeth Chand RN  Safety Promotion/Fall Prevention:   assistive device/personal items within reach   clutter free environment maintained   Goal Outcome Evaluation:              Outcome Summary: VSS, some pain in abdomin/head but very low no medication needed. Patient states she had three seizures per her RN friend before ambulance arrived to get her teeth/jaw are sore today MD notified.

## 2021-07-10 VITALS
OXYGEN SATURATION: 97 % | HEIGHT: 61 IN | HEART RATE: 69 BPM | WEIGHT: 135.14 LBS | BODY MASS INDEX: 25.52 KG/M2 | TEMPERATURE: 98 F | SYSTOLIC BLOOD PRESSURE: 107 MMHG | DIASTOLIC BLOOD PRESSURE: 69 MMHG | RESPIRATION RATE: 14 BRPM

## 2021-07-10 LAB
ANION GAP SERPL CALCULATED.3IONS-SCNC: 7.3 MMOL/L (ref 5–15)
BUN SERPL-MCNC: 7 MG/DL (ref 6–20)
BUN/CREAT SERPL: 10.3 (ref 7–25)
CALCIUM SPEC-SCNC: 8.7 MG/DL (ref 8.6–10.5)
CHLORIDE SERPL-SCNC: 110 MMOL/L (ref 98–107)
CK SERPL-CCNC: 96 U/L (ref 20–180)
CO2 SERPL-SCNC: 19.7 MMOL/L (ref 22–29)
CREAT SERPL-MCNC: 0.68 MG/DL (ref 0.57–1)
GFR SERPL CREATININE-BSD FRML MDRD: 117 ML/MIN/1.73
GFR SERPL CREATININE-BSD FRML MDRD: 97 ML/MIN/1.73
GLUCOSE SERPL-MCNC: 83 MG/DL (ref 65–99)
POTASSIUM SERPL-SCNC: 3.9 MMOL/L (ref 3.5–5.2)
SODIUM SERPL-SCNC: 137 MMOL/L (ref 136–145)

## 2021-07-10 PROCEDURE — 80048 BASIC METABOLIC PNL TOTAL CA: CPT | Performed by: HOSPITALIST

## 2021-07-10 PROCEDURE — 99214 OFFICE O/P EST MOD 30 MIN: CPT | Performed by: NURSE PRACTITIONER

## 2021-07-10 PROCEDURE — G0378 HOSPITAL OBSERVATION PER HR: HCPCS

## 2021-07-10 RX ORDER — TOPIRAMATE 50 MG/1
50 TABLET, FILM COATED ORAL NIGHTLY
Qty: 30 TABLET | Refills: 0 | Status: SHIPPED | OUTPATIENT
Start: 2021-07-10 | End: 2021-08-26

## 2021-07-10 RX ORDER — SUMATRIPTAN 100 MG/1
TABLET, FILM COATED ORAL
Qty: 5 TABLET | Refills: 0 | Status: SHIPPED | OUTPATIENT
Start: 2021-07-10 | End: 2021-08-26

## 2021-07-10 NOTE — DISCHARGE SUMMARY
[facility]    Date of Discharge:  7/10/2021    PCP: Jyoti Bragg APRN    Discharge Diagnosis:   Active Hospital Problems    Diagnosis  POA   • **Enteritis [K52.9]  Yes   • Syncope and collapse [R55]  Yes   • Hypokalemia [E87.6]  Yes      Resolved Hospital Problems   No resolved problems to display.          Consults     Date and Time Order Name Status Description    7/9/2021  3:26 PM Inpatient Neurology Consult General Completed     7/9/2021  2:25 AM [facility] (on-call MD unless specified) Details Completed         Hospital Course   40-year-old female, presented to the hospital with abdominal pain and diarrhea.  CT scan shows enteritis.  I suspect viral enteritis.  There were also reports of syncope and collapse.  Patient also complained of headache, neurology evaluation was requested.  Based on neurology's recommendations, patient was diagnosed to have symptoms consistent with migraine.  Patient would need an EEG as well as MRI of the brain with and without contrast.  This can be done on outpatient basis based on neuro recommendations.  Patient would be prescribe sumatriptan as abortive therapy regimen for migraine flares.  Patient would also be provided Topamax 50 mg at bedtime for prophylaxis of migraine.  Patient was also advised to follow-up with Neuro on outpatient basis.    Condition on Discharge: Improved.     Temp:  [97.9 °F (36.6 °C)-98 °F (36.7 °C)] 98 °F (36.7 °C)  Heart Rate:  [66-70] 69  Resp:  [14-18] 14  BP: ()/(61-69) 107/69  Body mass index is 25.53 kg/m².    Physical Exam   Vitals and nursing note reviewed.   Constitutional:       General: She is not in acute distress.     Appearance: She is well-developed. She is not toxic-appearing.   HENT:      Head: Normocephalic and atraumatic.   Eyes:      General: No scleral icterus.        Right eye: No discharge.         Left eye: No discharge.      Conjunctiva/sclera: Conjunctivae normal.    Neck:      Vascular: No JVD.   Cardiovascular:      Rate and Rhythm: Normal rate and regular rhythm.      Heart sounds: Normal heart sounds. No murmur heard.   No friction rub. No gallop.    Pulmonary:      Effort: Pulmonary effort is normal. No respiratory distress.      Breath sounds: Normal breath sounds. No wheezing or rales.   Abdominal:      General: Bowel sounds are normal. There is no distension.      Palpations: Abdomen is soft.      Tenderness: There is abdominal tenderness. There is no guarding or rebound.   Musculoskeletal:         General: No tenderness or deformity. Normal range of motion.      Cervical back: Normal range of motion and neck supple.   Skin:     General: Skin is warm and dry.      Capillary Refill: Capillary refill takes less than 2 seconds.   Neurological:      Mental Status: She is alert and oriented to person, place, and time.   Psychiatric:         Behavior: Behavior normal.     Disposition: Home or Self Care       Discharge Medications      New Medications      Instructions Start Date   SUMAtriptan 100 MG tablet  Commonly known as: Imitrex   Take one tablet at onset of headache. May repeat dose one time in 2 hours if headache not relieved.      topiramate 50 MG tablet  Commonly known as: TOPAMAX   50 mg, Oral, Nightly         Continue These Medications      Instructions Start Date   cholecalciferol 25 MCG (1000 UT) tablet  Commonly known as: VITAMIN D3   1,000 Units, Oral, Daily      multivitamin with minerals tablet tablet   1 tablet, Oral, Daily              Additional Instructions for the Follow-ups that You Need to Schedule     Discharge Follow-up with PCP   As directed       Currently Documented PCP:    Jyoti Bragg APRN    PCP Phone Number:    [phone]     Follow Up Details: Follow-up with PCP in 7 days.         Discharge Follow-up with Specialty: Follow-up with neurology in 1-2 weeks for getting MRI of brain as well as EEG.   As directed      Specialty: Follow-up  with neurology in 1-2 weeks for getting MRI of brain as well as EEG.         MRI Brain With & Without Contrast   Jul 15, 2021      with thin sections through the hippocampus    STEREOTACTIC GUIDANCE PROTOCOL?: No    Will fiducial markers be needed for this procedure?: No    Pregnant?: No    Release to patient: Immediate           Follow-up Information     Jyoti Bragg APRN .    Specialties: Neurology, Nurse Practitioner, Emergency Medicine  Why: Follow-up with PCP in 7 days.  Contact information:  [facility] [id number] [phone]                     Fabio Leger MD  07/10/21  14:05 EDT    Discharge time spent greater than 30 minutes.

## 2021-07-10 NOTE — PLAN OF CARE
Problem: Adult Inpatient Plan of Care  Goal: Plan of Care Review  Outcome: Ongoing, Progressing  Flowsheets (Taken 7/10/2021 0406)  Progress: improving  Plan of Care Reviewed With: patient  Outcome Summary: AO4. VSS. Pt headache was relieved with 1 dose of sumatriptan. Awaiting MRI to be performed today. Pt rested comfortably throughout the shift. Nursing will continue to monitor.  Goal: Patient-Specific Goal (Individualized)  Outcome: Ongoing, Progressing  Goal: Absence of Hospital-Acquired Illness or Injury  Outcome: Ongoing, Progressing  Intervention: Identify and Manage Fall Risk  Recent Flowsheet Documentation  Taken 7/10/2021 0402 by Xiomara Powell RN  Safety Promotion/Fall Prevention: safety round/check completed  Taken 7/10/2021 0220 by Xiomara Powell RN  Safety Promotion/Fall Prevention: safety round/check completed  Taken 7/10/2021 0008 by Xiomara Powell RN  Safety Promotion/Fall Prevention: safety round/check completed  Taken 7/9/2021 2230 by Xiomara Powell RN  Safety Promotion/Fall Prevention: safety round/check completed  Taken 7/9/2021 2050 by Xiomara Powell RN  Safety Promotion/Fall Prevention: safety round/check completed  Intervention: Prevent Skin Injury  Recent Flowsheet Documentation  Taken 7/10/2021 0402 by Xiomara Powell RN  Body Position: position changed independently  Taken 7/10/2021 0220 by Xiomara Powell RN  Body Position: position changed independently  Taken 7/10/2021 0008 by Xiomara Powell RN  Body Position: position changed independently  Taken 7/9/2021 2230 by Xiomara Powell RN  Body Position: position changed independently  Taken 7/9/2021 2050 by Xiomara Powell RN  Body Position: position changed independently  Goal: Optimal Comfort and Wellbeing  Outcome: Ongoing, Progressing  Goal: Readiness for Transition of Care  Outcome: Ongoing, Progressing     Problem: Syncope  Goal: Absence of Syncopal Symptoms  Outcome: Ongoing, Progressing   Goal Outcome Evaluation:  Plan of Care Reviewed With:  patient        Progress: improving  Outcome Summary: AO4. VSS. Pt headache was relieved with 1 dose of sumatriptan. Awaiting MRI to be performed today. Pt rested comfortably throughout the shift. Nursing will continue to monitor.

## 2021-07-10 NOTE — DISCHARGE INSTRUCTIONS
Seizure Precautions: Patient is not to drive for at least 3 months until cleared by a physician, operate heavy machinery, take tub baths, swim unattended, climb heights, or perform any other activities that can bring harm to himself/herself or others during an episode of altered awareness.        Patient is [place] resident which I believe is 180 days without driving from time of event whether it be syncope and/or seizure-like activity.

## 2021-07-10 NOTE — PROGRESS NOTES
"DOS: 7/10/2021  NAME: Kelsey Theodore   : 1980  PCP: Ammon Low MD  Chief Complaint   Patient presents with   • Syncope   • Abdominal Pain   Patient seen in follow-up today; new to me        General neurology consult    Subjective: Patient reports headache is improved; mild generalized.  She is anxious to discharge to home and states that she wants to get imaging and EEG as outpatient.     at bedside; discussed seizure precautions and recommendations to not drive per [place] [employer] law regarding questionable syncope/seizure-which is thought to be 6 months per [place] [employer] [employer] law; 90 days per [place] [employer] law.    Objective:  Vital signs: BP 99/62 (BP Location: Left arm, Patient Position: Lying)   Pulse 70   Temp 97.9 °F (36.6 °C) (Oral)   Resp 14   Ht 154.9 cm (61\")   Wt 61.3 kg (135 lb 2.3 oz)   SpO2 97%   BMI 25.53 kg/m²       HEENT: Normocephalic, atraumatic   COR: RRR  Resp: Even and unlabored  Extremities: Equal pulses, nondistal embolization    Neurological:   MS: AO. Language normal. No neglect. Higher integrative function normal  CN: II-XII normal  Motor: 5/5, normal tone  Reflexes: Toes downgoing  Sensory: Intact-to light touch   coordination: Normal-finger-to-nose    Laboratory results:  Lab Results   Component Value Date    GLUCOSE 83 07/10/2021    CALCIUM 8.7 07/10/2021     07/10/2021    K 3.9 07/10/2021    CO2 19.7 (L) 07/10/2021     (H) 07/10/2021    BUN 7 07/10/2021    CREATININE 0.68 07/10/2021    EGFRIFAFRI 117 07/10/2021    EGFRIFNONA 97 07/10/2021    BCR 10.3 07/10/2021    ANIONGAP 7.3 07/10/2021     Lab Results   Component Value Date    WBC 4.32 2021    HGB 12.3 2021    HCT 35.4 2021    MCV 93.4 2021     2021     No results found for: CHOL  No results found for: HDL  No results found for: LDL  No results found for: TRIG           Review and interpretation of imaging:  CT ABDOMEN PELVIS W CONTRAST-     Radiation " dose reduction techniques were utilized, including automated  exposure control and exposure modulation based on body size.     Pedicle: Abdominal pain, acute, nonlocalized     COMPARISON: None     FINDINGS: The liver, gallbladder, pancreas and spleen are satisfactory  in appearance. No biliary duct dilatation. Both adrenal glands are  normal. Both kidneys are normal. No obstructive uropathy or calculus.  Diameter of the aorta is within normal limits. Urinary bladder is  normal. Uterus is anteverted, size appropriate for age. Both ovaries are  symmetric and satisfactory in appearance. No free fluid within the  pelvic cul-de-sac. No adnexal abnormality seen.     Formed fecal material is demonstrated throughout the colon. No colon  Wall thickening nor induration of the surrounding fat to suggest an  inflammatory process. A loop of jejunum within the left upper quadrant  is prominent with wall and fold thickening, suggestive of enteritis. The  stomach is distended and fluid-filled, no wall thickening seen. The  duodenal bulb is satisfactory in appearance. The remainder of the  duodenum is unremarkable. No free air.     CONCLUSION: Prominent loop of jejunum within the left upper quadrant  with wall and fold thickening suggestive of enteritis. Stomach is  distended and fluid-filled however no wall thickening seen. The  remainder of examination is unremarkable.     FINDINGS of this port called to Yasir Fall at the time of completion  1:30 AM.      Seizure Precautions: Patient is not to drive for at least 3 months until cleared by a physician, operate heavy machinery, take tub baths, swim unattended, climb heights, or perform any other activities that can bring harm to himself/herself or others during an episode of altered awareness.  This report was finalized on 7/9/2021 1:50 AM by Dr. Otf Gottlieb M.D.       Impression: This is a 40-year-old right-handed  female who presented to [facility] 7[facility]8  due to report of nausea vomiting and diarrhea for the last 2 weeks long with headache.  Chart review reflects that patient has no history of migraine of recent although she did have them as a teenager.  Over the past 2 weeks headaches have appeared to have gotten worse.  Day prior to arrival patient had convulsive episode x3 all very short intervals without evidence of tongue biting and/or urinary bowel incontinence.  Patient reports increased stress at job and at home.    Neurologically, stable with mild generalized headache although much improved.  Recommendations below.  If patient opts to leave will recommend MRI of the brain with and without contrast to be completed in next 1 to 2 weeks and prolonged EEG 48-hour be completed and follow-up in our office in 1 to 2 months.  Continue Topamax 50 mg nightly; keep headache log and call with worsening interchanging of headache. PT/OT/ST. CCP to assist with discharge planning. Call RRT for any acute neurological changes and/or concerns. We will continue to follow and advise.         Plan:  · MRI of the brain with and without contrast with thin sections through hypothalamus  · EEG; may need to obtain as outpatient- prolonged EEG   · Sumatriptan 100 mg at onset of headache; may be repeated 2 hours after if headache persists.  No more than 2 doses in 24 hours  · Topamax 50 mg at bedtime for headache prophylaxis  · Headache hygiene  · Precautions below-discussed with patient and  and both verbalized agreement and understanding; [place] state law referenced below-patient is a [place] resident which I believe is 180 days event free before resuming driving    Seizure Precautions: Patient is not to drive for at least 3 months until cleared by a physician, operate heavy machinery, take tub baths, swim unattended, climb heights, or perform any other activities that can bring harm to himself/herself or others during an episode of altered awareness.          Case reviewed  with attending neurologist  and he agrees w/ plan above.       Jyoti Bragg, APRKENDRA     Addenda:   Patient requesting to complete MRI of the brain with and without contrast as outpatient; reviewed with attending neurologist Dr. Lopez who is in agreement.  Ask that nursing reintegrate driving restrictions and seizure precautions prior to discharge.     Jyoti Bragg, VENANCIO  13:19 EDT

## 2021-08-20 ENCOUNTER — TELEPHONE (OUTPATIENT)
Dept: NEUROLOGY | Facility: CLINIC | Age: 41
End: 2021-08-20

## 2021-08-20 NOTE — TELEPHONE ENCOUNTER
Per pt, she is currently taking:    Excedrin is the only thing she is taking for migraines.     Pt d/c:  Topiramate 50 mg - stopped taking a week ago to see if she had any seizures. Pt sts she has has not seizures or seizure-like activity since d/c.    Ubrelvy 100 mg - stopped due to nausea and sleepiness    Sumatriptan - pt never started

## 2021-08-20 NOTE — TELEPHONE ENCOUNTER
Provider: SIMÓN OTOOLE    Caller: FELICIANO    Relationship to Patient: SELF    Phone Number: [phone]    Reason for Call:   CALLING TO SCHED F/U WITH MATTEO AFTER COMPLETING EEG ON 8-11 AND MRI @ [facility] ON 7-26. SCHEDULED NEXT AVAIL FOR November. WOULD MATTEO BE ABLE TO CALL HER WITH RESULTS BEFORE THEN? SHE HAS MRI DISK IN HAND.     ALSO HAS TRIED THE UBRELVY AND SAYS IT MAKES HER NAUSEOUS AND SLEEPY / HAS NO ENERGY. STARTED TAKING AFTER DISCHARGE BUT STOPPED TAKING ABOUT A WEEK AGO DUE TO SIDE EFFECTS. WOULD LIKE TO KNOW IF THERE ARE ANY OTHER RXS SHE COULD TRY JUST AS A SAMPLE BEFORE GETTING A FULL SCRIPT SENT IN. PLEASE ADVISE.

## 2021-08-20 NOTE — TELEPHONE ENCOUNTER
Ask if she still taking the Topamax?  What dose of Ubrelvy did she use?  Does she get nausea with her headaches?   Does she have a prescription for a triptan?

## 2021-08-20 NOTE — TELEPHONE ENCOUNTER
Pt will call  for all medications and strengths and will call me back. Please transfer call to me.

## 2021-08-20 NOTE — TELEPHONE ENCOUNTER
Pt sts PCP gave her samples of ubrelvy and it makes her very nauseous and sleepy. She is wanting to know if there is anything else you can recommend. Pt is unsure if she took the sumatriptan that was prescribed in the hosp. Stating she is not very familiar with her medications.     Informed pt we would call when MRI results are received.

## 2021-08-22 ENCOUNTER — DOCUMENTATION (OUTPATIENT)
Dept: NEUROLOGY | Facility: CLINIC | Age: 41
End: 2021-08-22

## 2021-08-22 NOTE — PROGRESS NOTES
Prelim EEG report:    Date of onset: August 9, 2021 at 11:20 AM  Date of offset: August 11, 2021 at 6:47 AM    Indication: Syncope    Technical description:  This is a 21 channel digital EEG recording that began on August 9, 2021 at 11:20 AM and ended on August 11, 2021 at 6:47 AM.  Elias and seizure detection software programs were used.    Background:  9-10 Hz alpha activity is present over the posterior head regions that symmetric, well formed and reactive to eye closure.  The patient enters the drowsy state and sleeps during the record.  Sleep activities are symmetric and contain vertex waves, sleep spindles, and K complexes.  There is no abnormal activation with photic stimulation.  Hyperventilation was not performed.  There are no asymmetries between the two hemispheres.  No interictal activity is present.    The EKG monitor shows a heart rate that varies between 55 and 65 beats per minute.    Clinical Interpretation:  This 2-day continuous at home EEG recording with video is normal in the awake and sleep states.  No potentially epileptogenic activity, seizure activity, or focal slowing is present.

## 2021-08-24 NOTE — TELEPHONE ENCOUNTER
Lets try a sample of Nurtec (HA abortive)  to see if that works and if she does not have any nausea. I would recommend Mag Ox 400mg and Riboflavin 400 mg daily to assist w/ HA prevention starting today.

## 2021-08-26 ENCOUNTER — TELEPHONE (OUTPATIENT)
Dept: NEUROLOGY | Facility: CLINIC | Age: 41
End: 2021-08-26

## 2021-08-26 DIAGNOSIS — R51.9 ACUTE NONINTRACTABLE HEADACHE, UNSPECIFIED HEADACHE TYPE: Primary | ICD-10-CM

## 2021-08-26 RX ORDER — RIMEGEPANT SULFATE 75 MG/75MG
75 TABLET, ORALLY DISINTEGRATING ORAL DAILY PRN
Qty: 4 TABLET | Refills: 0 | COMMUNITY
Start: 2021-08-26 | End: 2021-11-30

## 2021-08-26 RX ORDER — MAGNESIUM OXIDE 400 MG/1
400 TABLET ORAL DAILY
Qty: 30 TABLET | Refills: 6 | Status: SHIPPED | OUTPATIENT
Start: 2021-08-26 | End: 2021-12-22

## 2021-08-26 NOTE — TELEPHONE ENCOUNTER
Pt will come by the office for [facility] samples. Instructed pt to take 1 75 mg tablet po at onset of h/a, not to take more than 75 mg in 24 hr period. Pt verbalized understanding.    Pt would you like you to send Mag Ox and Riboflavin to pharmacy. I have ordered, please review and sign off if appropriate.     Thank you

## 2021-08-26 NOTE — TELEPHONE ENCOUNTER
----- Message from VENANCIO Ferraro sent at 8/26/2021 11:58 AM EDT -----  EEG unremarkable; no evidence of epileptiform discharges and/or seizure-like activity/slowing noted.  Keep planned follow-up.

## 2021-11-30 ENCOUNTER — TELEPHONE (OUTPATIENT)
Dept: NEUROLOGY | Facility: CLINIC | Age: 41
End: 2021-11-30

## 2021-11-30 ENCOUNTER — OFFICE VISIT (OUTPATIENT)
Dept: NEUROLOGY | Facility: CLINIC | Age: 41
End: 2021-11-30

## 2021-11-30 VITALS
BODY MASS INDEX: 25.3 KG/M2 | DIASTOLIC BLOOD PRESSURE: 88 MMHG | HEART RATE: 82 BPM | SYSTOLIC BLOOD PRESSURE: 118 MMHG | WEIGHT: 134 LBS | HEIGHT: 61 IN | OXYGEN SATURATION: 97 %

## 2021-11-30 DIAGNOSIS — G43.101 MIGRAINE WITH AURA AND WITH STATUS MIGRAINOSUS, NOT INTRACTABLE: ICD-10-CM

## 2021-11-30 DIAGNOSIS — R56.9 SEIZURE-LIKE ACTIVITY (HCC): ICD-10-CM

## 2021-11-30 DIAGNOSIS — M54.81 OCCIPITAL NEURALGIA OF RIGHT SIDE: Primary | ICD-10-CM

## 2021-11-30 DIAGNOSIS — G47.9 SLEEP DISTURBANCES: ICD-10-CM

## 2021-11-30 DIAGNOSIS — G44.209 TENSION HEADACHE: ICD-10-CM

## 2021-11-30 DIAGNOSIS — F45.8 BRUXISM (TEETH GRINDING): ICD-10-CM

## 2021-11-30 DIAGNOSIS — R06.83 SNORINGS: ICD-10-CM

## 2021-11-30 PROCEDURE — 99214 OFFICE O/P EST MOD 30 MIN: CPT | Performed by: NURSE PRACTITIONER

## 2021-11-30 PROCEDURE — 64405 NJX AA&/STRD GR OCPL NRV: CPT | Performed by: NURSE PRACTITIONER

## 2021-11-30 RX ORDER — BUTALBITAL, ACETAMINOPHEN AND CAFFEINE 50; 325; 40 MG/1; MG/1; MG/1
TABLET ORAL
COMMUNITY

## 2021-11-30 RX ORDER — DEXAMETHASONE SODIUM PHOSPHATE 4 MG/ML
4 INJECTION, SOLUTION INTRA-ARTICULAR; INTRALESIONAL; INTRAMUSCULAR; INTRAVENOUS; SOFT TISSUE ONCE
Status: COMPLETED | OUTPATIENT
Start: 2021-11-30 | End: 2021-11-30

## 2021-11-30 RX ORDER — BACLOFEN 10 MG/1
10 TABLET ORAL
Qty: 30 TABLET | Refills: 0 | Status: SHIPPED | OUTPATIENT
Start: 2021-11-30 | End: 2022-11-30

## 2021-11-30 RX ORDER — ERGOCALCIFEROL 1.25 MG/1
CAPSULE ORAL
COMMUNITY

## 2021-11-30 RX ORDER — LIDOCAINE HYDROCHLORIDE 10 MG/ML
4 INJECTION, SOLUTION INFILTRATION; PERINEURAL ONCE
Status: COMPLETED | OUTPATIENT
Start: 2021-11-30 | End: 2021-11-30

## 2021-11-30 RX ORDER — IBUPROFEN 600 MG/1
TABLET ORAL
COMMUNITY

## 2021-11-30 RX ORDER — PSEUDOEPHEDRINE HCL 120 MG/1
TABLET, FILM COATED, EXTENDED RELEASE ORAL EVERY 12 HOURS
COMMUNITY

## 2021-11-30 RX ADMIN — LIDOCAINE HYDROCHLORIDE 4 ML: 10 INJECTION, SOLUTION INFILTRATION; PERINEURAL at 15:08

## 2021-11-30 RX ADMIN — DEXAMETHASONE SODIUM PHOSPHATE 4 MG: 4 INJECTION, SOLUTION INTRA-ARTICULAR; INTRALESIONAL; INTRAMUSCULAR; INTRAVENOUS; SOFT TISSUE at 15:07

## 2021-11-30 NOTE — PROGRESS NOTES
"CC: Hospital follow-up    HPI:  Kelsey Theodore is a  40 y.o.  right-handed female with history of severe migraines as a teenager and now other prior significant medical history who I am seeing today in follow-up from hospitalization July 2021.    Patient presented with nausea/vomiting/diarrhea and 2-week persistent headache.  Day prior to admission patient had convulsive episode x3 all very short intervals without evidence of tongue biting and/or urinary incontinence.  Patient reports minimal EtOH use days prior to that admission.  Patient since had prolonged EEG completed which was read as normal.  She also had MRI completed as outpatient which was also read as normal; only reviewed report have asked patient to bring disc to the office for me to review further.     Since discharge, patient reports that she has had only 3 significant headaches with the most recent one being last week.  She reports headaches posterior right occipital and are often associated with aura and neck stiffness/discomfort.  She does have mild generalized daily headache which are \"tolerable\".  Patient admits to being very anxious and \"stressed\".  She is currently seeing therapist.  Occipital region headaches and palpable occipital tenderness I have recommended occipital nerve block which patient is agreeable to.  Patient also reports planned like headache therefore will recommend low-dose baclofen nightly to assist with tension type headache also patient has bruxism and notes that headaches are significantly worse in the morning.  Have asked that patient keep headache journal to further assist with pinpointing headache triggers.  She has tried Nurtec/Ubrelvy/Topamax and sumatriptan which have all caused some degree of side effects specifically nausea and impaired appetite.  She is currently taking Mag-Ox and riboflavin and using Fioricet as needed which has offered her moderate relief.  She only takes 1 headache is \"severe\".  She reports " decrease in daily wine consumption; no longer consuming red wine at all was previously consuming 2 to 3 glasses of wine per night now only has 2 to 3 glasses/week.  She has attempted to decrease caffeine intake only has approximately 3 caffeinated beverages per day; previously was drinking monster energy drinks.  She does note some right hand tremoring with increased caffeine consumption; improved with decrease consumption.  Further discussion reveals that patient has had poor sleep quality greater than normal over the past 2 months patient reports that she previously was getting approximately 6 hours of sleep per night now will likely get 2-3.  She has very broken sleep.  She notes that her  states that she is snoring louder and more than normal.  We will recommend referral to sleep medicine which patient is agreeable to.  Since discharge, patient denies any other episodes of seizure like activity.  She denies any other complaints or concerns on my exam.  I will plan to see her back in 6 months time; sooner if needed.    Indication for Procedure: right Occipital Neuralgia  Procedure:  right Greater Occipital Nerve Block    Risks and explained to the patient including the risk of allergic reaction, bleeding, bruising, local infection, and localized pain. Patient expressed understanding and both verbal and written consent was obtained.    Time out performed.    right occipital nerve root identified utilizing palpation techniques.      5cc of of the following medications were administered utilizing a 5cc syringe and a 25 gauge needle surrounding the nerve root in a 360 degree fashion.  The mixture contained:  4ml of 1% Lidocaine   1 ml.of 4 mg dexamethasone     The patient tolerated the procedure well with no complications and no blood loss.    The patient is to follow up for repeat injection in prn      Plan:   History reviewed. No pertinent past medical history.      Past Surgical History:   Procedure  Laterality Date   • ABDOMINOPLASTY     •  SECTION             Current Outpatient Medications:   •  B Complex Vitamins (VITAMIN B COMPLEX PO), Take  by mouth., Disp: , Rfl:   •  butalbital-acetaminophen-caffeine (FIORICET, ESGIC) -40 MG per tablet, butalbital-acetaminophen-caffeine 50 mg-325 mg-40 mg tablet  TAKE 1 TABLET BY MOUTH TWICE DAILY AS NEEDED, Disp: , Rfl:   •  cholecalciferol (VITAMIN D3) 25 MCG (1000 UT) tablet, Take 1,000 Units by mouth Daily., Disp: , Rfl:   •  ergocalciferol (ERGOCALCIFEROL) 1.25 MG (17120 UT) capsule, ergocalciferol (vitamin D2) 1,250 mcg (50,000 unit) capsule  TAKE 1 CAPSULE BY MOUTH EVERY WEEK, Disp: , Rfl:   •  ibuprofen (ADVIL,MOTRIN) 600 MG tablet, ibuprofen 600 mg tablet  TK 1 T PO  Q 8 H PRN P, Disp: , Rfl:   •  magnesium oxide (MAG-OX) 400 MG tablet, Take 1 tablet by mouth Daily., Disp: 30 tablet, Rfl: 6  •  multivitamin with minerals (MULTIVITAMIN ADULT PO), Take 1 tablet by mouth Daily., Disp: , Rfl:   •  pseudoephedrine (SUDAFED) 120 MG 12 hr tablet, Every 12 (Twelve) Hours., Disp: , Rfl:   •  Riboflavin 400 MG capsule, Take 400 mg by mouth Every Morning., Disp: 30 each, Rfl: 6  •  baclofen (LIORESAL) 10 MG tablet, Take 1 tablet by mouth every night at bedtime., Disp: 30 tablet, Rfl: 0      Family History   Problem Relation Age of Onset   • Stroke Maternal Grandfather    • Dementia Paternal Grandmother          Social History     Socioeconomic History   • Marital status:    Tobacco Use   • Smoking status: Never Smoker   • Smokeless tobacco: Never Used   Substance and Sexual Activity   • Alcohol use: Not Currently   • Drug use: Not Currently         Allergies   Allergen Reactions   • Penicillins Other (See Comments)     UNSURE          Pain Scale:4/10 right occipital region       ROS:  Review of Systems   Constitutional: Positive for appetite change (decreased ). Negative for activity change and unexpected weight change.   HENT: Negative for facial  "swelling, trouble swallowing and voice change.    Eyes: Positive for visual disturbance (bilateral blurred). Negative for photophobia and pain.   Respiratory: Negative for chest tightness, shortness of breath and wheezing.    Cardiovascular: Negative for chest pain, palpitations and leg swelling.   Gastrointestinal: Positive for nausea. Negative for abdominal pain and vomiting.   Endocrine: Negative for cold intolerance and heat intolerance.   Musculoskeletal: Negative for arthralgias, back pain, gait problem, joint swelling, myalgias, neck pain and neck stiffness.   Neurological: Negative for dizziness, tremors, seizures, syncope, facial asymmetry, speech difficulty, weakness, light-headedness, numbness and headaches.   Hematological: Does not bruise/bleed easily.   Psychiatric/Behavioral: Positive for decreased concentration and sleep disturbance (staying asleep ). Negative for agitation, behavioral problems, confusion, dysphoric mood, hallucinations, self-injury and suicidal ideas. The patient is nervous/anxious. The patient is not hyperactive.            Physical Exam:  Vitals:    11/30/21 1324   BP: 118/88   BP Location: Left arm   Patient Position: Sitting   Cuff Size: Adult   Pulse: 82   SpO2: 97%   Weight: 60.8 kg (134 lb)   Height: 154.9 cm (61\")     Body mass index is 25.32 kg/m².    Physical Exam  Head: Head is erect and midline: skull is normocephalic, symmetrical and smooth without deformity. Facial features are symmetrical.  Occipital tenderness noted on right.   Neck:  Trachea is midline; no JVD.   Skin:  Warm and dry. No edema, erythema, rashes, eczema, nodules, masses, or signs of infection noted. No scars present.   Hair:  Hair clean and scalp without lesions, no exudates or sores noted. Hair long; has normal distribution.  Nails: well groomed; short nails. Nail beds painted; no redness, exudates, or swelling seen in surrounding folds, and no tenderness to palpation.   Eyes: conjunctiva pink; " sclera white; PERRL. No tearing noted. Pupils constricted 4mm to 2mm   Ears:  Normal position.  Chest:  The trachea is midline. The chest is normal in appearance. The chest is clear to percussion and auscultation.  Heart:  HR 82 beats per minute, S1 and S2 noted with regular rhythm. /88. No abnormal jugular venous distention. No rubs, murmurs, or gallops noted upon auscultation.   Musculoskeletal:  The extremities are normal in color, size, and temperature. All pulses in the upper and lower extremities are grade II and equal. No clubbing, cyanosis, or edema is present.   Neurological:  Alert, relaxed, cooperative. Thought process coherent. Oriented to person, place and time. CN II- XII intact. Good muscle bulk and tone. Strength 5/5 throughout. Cerebellar: Rapid alternating movements, finger-to-nose, heel-to-shin intact. Gait with normal base. Sensory:  light touch  intact. Reflexes:  plantar reflexes downgoing.     Results:      Lab Results   Component Value Date    GLUCOSE 83 07/10/2021    BUN 7 07/10/2021    CREATININE 0.68 07/10/2021    EGFRIFNONA 97 07/10/2021    EGFRIFAFRI 117 07/10/2021    BCR 10.3 07/10/2021    CO2 19.7 (L) 07/10/2021    CALCIUM 8.7 07/10/2021    ALBUMIN 3.40 (L) 07/08/2021    AST 15 07/08/2021    ALT 15 07/08/2021       Lab Results   Component Value Date    WBC 4.32 07/09/2021    HGB 12.3 07/09/2021    HCT 35.4 07/09/2021    MCV 93.4 07/09/2021     07/09/2021         Assessment:   1.  Headaches; with migraine-like qualities although has tension overlay and right sided occipital neuralgia  2.  Sleep disturbance; worse headache in a.m./worsening snoring/teeth grinding  3.  Bruxism  4.  Anxiety  5.  Convulsion episode during July 2021 admission; prolonged EEG unremarkable            Plan:  · Complete right sided occipital nerve block today  · Continue to use Fioricet as needed for breakthrough headache  · Continue Mag-Ox and riboflavin as written  · ELIN evaluation; referral to  sleep medicine  · Baclofen 5 to 10 mg nightly to assist with tension type headaches in teeth grinding  · Follow-up with me in 6 months         Diagnoses and all orders for this visit:    1. Migraine with aura and with status migrainosus, not intractable (Primary)    2. Tension headache  -     baclofen (LIORESAL) 10 MG tablet; Take 1 tablet by mouth every night at bedtime.  Dispense: 30 tablet; Refill: 0    3. Bruxism (teeth grinding)  -     baclofen (LIORESAL) 10 MG tablet; Take 1 tablet by mouth every night at bedtime.  Dispense: 30 tablet; Refill: 0  -     Ambulatory Referral to Sleep Medicine    4. Sleep disturbances  -     Ambulatory Referral to Sleep Medicine    5. Snorings  -     Ambulatory Referral to Sleep Medicine    6. Seizure-like activity (HCC)    7. Occipital neuralgia of right side                                    Dictated utilizing Dragon dictation.

## 2021-11-30 NOTE — TELEPHONE ENCOUNTER
Provider: MATTEO OTOOLE  Caller: PATIENT  Relationship to Patient: SELF  Pharmacy: NA  Phone Number: [phone]  Reason for Call: PATIENT STATES THAT SHE HAD DROPPED OFF MRI CD TO  LAST MONTH. SHE SAID SHE REMEMBERED MAKING SPECIAL TRIP TO DROP OFF THE CD. PLEASE ADVISE.   When was the patient last seen: TODAY

## 2021-12-02 DIAGNOSIS — G44.209 TENSION HEADACHE: ICD-10-CM

## 2021-12-02 DIAGNOSIS — F45.8 BRUXISM (TEETH GRINDING): ICD-10-CM

## 2021-12-02 RX ORDER — BACLOFEN 10 MG/1
10 TABLET ORAL
Qty: 30 TABLET | Refills: 0 | OUTPATIENT
Start: 2021-12-02 | End: 2022-12-02

## 2021-12-06 NOTE — TELEPHONE ENCOUNTER
Per representative of [facility], faxed request for disc of MRI Brain done on 07/26/21 to be mailed to our office.

## 2021-12-22 RX ORDER — RIBOFLAVIN (VITAMIN B2) 400 MG
1 TABLET ORAL DAILY
Qty: 30 TABLET | Refills: 2 | Status: SHIPPED | OUTPATIENT
Start: 2021-12-22

## 2021-12-22 RX ORDER — MAGNESIUM OXIDE 400 MG/1
400 TABLET ORAL DAILY
Qty: 30 TABLET | Refills: 2 | Status: SHIPPED | OUTPATIENT
Start: 2021-12-22

## 2022-01-04 ENCOUNTER — TRANSCRIBE ORDERS (OUTPATIENT)
Dept: ADMINISTRATIVE | Facility: HOSPITAL | Age: 42
End: 2022-01-04

## 2022-01-04 ENCOUNTER — LAB (OUTPATIENT)
Dept: LAB | Facility: HOSPITAL | Age: 42
End: 2022-01-04

## 2022-01-04 DIAGNOSIS — Z11.52 ENCOUNTER FOR SCREENING FOR SEVERE ACUTE RESPIRATORY SYNDROME CORONAVIRUS 2 (SARS-COV-2) INFECTION: Primary | ICD-10-CM

## 2022-01-04 DIAGNOSIS — Z11.52 ENCOUNTER FOR SCREENING FOR SEVERE ACUTE RESPIRATORY SYNDROME CORONAVIRUS 2 (SARS-COV-2) INFECTION: ICD-10-CM

## 2022-01-04 LAB — SARS-COV-2 ORF1AB RESP QL NAA+PROBE: DETECTED

## 2022-01-04 PROCEDURE — C9803 HOPD COVID-19 SPEC COLLECT: HCPCS

## 2022-01-04 PROCEDURE — U0004 COV-19 TEST NON-CDC HGH THRU: HCPCS

## 2022-06-15 NOTE — TELEPHONE ENCOUNTER
Pt sts she brought disc to office prior to appt. I do not have it. I can call [facility] if you are needing it.       Thank you   anastrozole 1 mg oral tablet , 1 tab(s) orally once a day  CeleBREX 200 mg oral capsule , 1 cap(s) orally once a day  Synthroid 137 mcg (0.137 mg) oral tablet , 1 tab(s) orally once a day  Tylenol 500 mg oral tablet , 2 tab(s) orally every 6 hours prn pain   Lexapro 20 mg oral tablet , 1 tab(s) orally once a day (at bedtime)  Relpax 40 mg oral tablet , 1 tab(s) orally once a day, As Needed for headache  COQ10 200 mg orally daily last dose 12/10  Flonase 50 mcg/inh nasal spray , 1 spray(s) nasal once a day  turmeric with bioperine 1 cap daily  last dose 12/10  Lutein 20 mg oral capsule , 1 cap(s) orally once a day  glucosamine/chondrotin 1500/1200  2x/day  last dose 12/10  Vitamin B6 100 mg oral tablet , 1 tab(s) orally once a day  Omega 3 fish oil 1200 mg daily  last dose 12/10  Laura-C 500 mg oral tablet , orally 2 times a day  Vitamin D3 1000 intl units oral capsule , 1 cap(s) orally once a day  aspirin 81 mg oral tablet , 1 tab(s) orally once a day  last dose 12/10  Immune complete 1 tab 2x/day last dose 12/10  Centrum oral tablet , 1 tab(s) orally once a day  last dose 12/10  PriLOSEC OTC 20 mg oral delayed release tablet , 1 tab(s) orally once a day  clobetasol 0.05% topical cream , Apply topically to affected area 2 times a day, As Needed  Symbicort 160 mcg-4.5 mcg/inh inhalation aerosol , 2 puff(s) inhaled 2 times a day  Ventolin HFA 90 mcg/inh inhalation aerosol , 2 puff(s) inhaled 4 times a day, As Needed  Aimovig 70 mg/mL subcutaneous solution , subcutaneous once a month  Plaquenil 200 mg oral tablet , 1 tab(s) orally 2 times a day  propranolol 20 mg oral tablet , 1 tab(s) orally 2 times a day